# Patient Record
(demographics unavailable — no encounter records)

---

## 2025-06-25 NOTE — PHYSICAL EXAM

## 2025-06-25 NOTE — ASSESSMENT
[FreeTextEntry1] : Numbness-local nerve impingement most likely - EMG/NCV of the UEs and LEs  I, Joseph Fitzgerald, attest that this documentation has been prepared under the direction and in the presence of Provider Roddy Anderson DO.   Thank you for allowing me to assist in the management of this patient.   Roddy Anderson DO Board Certified, Neurology.

## 2025-06-25 NOTE — HISTORY OF PRESENT ILLNESS
[FreeTextEntry1] : It is a pleasure to see Mr. JACQUIE GRAHAM in office today. He is an 39-year-old man who works as a . He presents for a neurologic consultation with a symptom of numbness in two fingers of his left hand as well as numbness in a single spot on the side of his right foot. He describes this numbness as a loss of sensation that is triggered by movement. He reports that this began 1 month ago, and that it occurred in both locations simultaneously. At the onset of his symptom, he was lifting his left arm above his head and moving it from side to side. He denies any pain shooting into his arms from his neck.